# Patient Record
Sex: FEMALE | Race: WHITE | NOT HISPANIC OR LATINO
[De-identification: names, ages, dates, MRNs, and addresses within clinical notes are randomized per-mention and may not be internally consistent; named-entity substitution may affect disease eponyms.]

---

## 2017-06-03 ENCOUNTER — HEALTH MAINTENANCE LETTER (OUTPATIENT)
Age: 37
End: 2017-06-03

## 2017-07-20 ENCOUNTER — TRANSFERRED RECORDS (OUTPATIENT)
Dept: HEALTH INFORMATION MANAGEMENT | Facility: CLINIC | Age: 37
End: 2017-07-20

## 2018-03-02 ENCOUNTER — VIRTUAL VISIT (OUTPATIENT)
Dept: FAMILY MEDICINE | Facility: OTHER | Age: 38
End: 2018-03-02

## 2018-03-04 NOTE — PROGRESS NOTES
"Date:   Clinician: Bette Kendall  Clinician NPI: [id number]  Patient: Una Bhat  Patient : 1980  Patient Address: [address] [place] [address]  Patient Phone: [phone]  Visit Protocol: URI  Patient Summary:  Una is a 37 year old ( : 1980 ) female who initiated a Visit for cold, sinus infection, or influenza. When asked the question \"Please sign me up to receive news, health information and promotions from [employer].\", Una responded \"No\".    Una states her symptoms started gradually 7-9 days ago.   Her symptoms consist of tooth pain, malaise, enlarged lymph nodes, a headache, nasal congestion, a cough, and facial pain or pressure. Una also feels feverish but was unable to measure her temperature.   Symptom details     Nasal secretions: The color of her mucus is blood-tinged and yellow.    Cough: Una coughs a few times an hour and her cough is more bothersome at night. Phlegm comes into her throat when she coughs. She does not believe the phlegm causes the cough. The color of the phlegm is yellow.     Facial pain or pressure: The facial pain or pressure feels worse when bending over or leaning forward.     Headache: She states the headache is moderate (between 4-6 on a 10 point pain scale).     Tooth pain: The tooth pain is not caused by a cavity, recent dental work, or other mouth problems.      Una denies having myalgias, rhinitis, sore throat, chills, dyspnea, ear pain, and wheezing. She also denies double sickening (worsening symptoms after initial improvement), having recent facial or sinus surgery in the past 60 days, and taking antibiotic medication for the symptoms.   She has not recently been exposed to someone with influenza. Una has been in close contact with the following high risk individuals: adults 65 or older.   Una had 1 sinus infection within the past year.   Weight: 255 lbs   " Una does not smoke or use smokeless tobacco.   She denies pregnancy and denies breastfeeding. She is currently menstruating.  MEDICATIONS:  Naproxen (Aleve, Naprosyn), guaifenesin (Robitussin, Mucinex), acetaminophen (Tylenol), phenylephrine (Sudafed PE), guaifenesin + dextromethorphan (Robitussin DM, Mytussin DM, Mucinex DM), and antacid  , ALLERGIES:   penicillin/amoxicillin/augmentin and sulfa (Bactrim/Septra)    Clinician Response:  Dear Una,  Based on the information provided, you have acute bacterial sinusitis, also known as a sinus infection. Sinus infections are caused by bacteria or a virus and symptoms are almost always identical. The difference between the 2 types of infections is timing.  Sinus infections start as viral infections and symptoms improve on their own in about 7 days. If symptoms have not improved after 7 days or have even worsened, a bacterial infection may have developed.  Medication information  I am prescribing:     Moxifloxacin hydrochloride (Avelox) 400 mg oral tablet. Take 1 tablet by mouth 1 time a day for 7 days. There are no refills with this prescription.   This medication is in the fluoroquinolone drug class. Fluoroquinolones are associated with increased risk of tendinitis and tendon rupture in all ages, which can occur during or after treatment. The risk of tendon problems may be increased if you are over 60 years of age, are using steroid medication, have severe kidney disease, or if you have a history of tendon problems. If you experience pain, swelling, inflammation, or rupture of a tendon, stop taking this medication and visit your healthcare provider or an urgent care.  Antibiotics can cause an allergic reaction even if you have taken them without a problem in the past. If you develop a new rash, swelling, or difficulty breathing, stop the medication and be seen in a clinic or urgent care immediately.  A yeast infection is a side effect of taking  antibiotics in some women. Please use [facility] to get treatment if you have symptoms of a yeast infection.  If you become pregnant during this course of treatment, stop taking the medication and contact your primary care provider.  Self care  The following tips will keep you as comfortable as possible while you recover:     Rest    Drink plenty of water and other liquids    Take a hot shower to loosen congestion    Take a spoonful of honey to reduce your cough     When to seek care  Please be seen in a clinic or urgent care if any of the following occur:     Symptoms do not start to improve after 3 days of treatment    New symptoms develop, or symptoms become worse      Diagnosis: Acute bacterial sinusitis  Diagnosis ICD: J01.90  Additional Clinician Notes: Take all of your antibiotics. Get rest anc drink plenty of fluids. Warm steamy showers and humidifiers are helpful. You may continue with the over the counter meds as needed    Prescription: moxifloxacin hydrochloride (Avelox) 400 mg oral tablet 7 tablets, 7 days supply. Take 1 tablet by mouth 1 time a day for 7 days. Refills: 0, Refill as needed: no, Allow substitutions: yes  Pharmacy: [id number] - [phone] - [address] [place] [address]  Addendum created: March 02 07:44:21, 2018 created by: Ney Brewer body: Call in zpak as directed  1 pack  No refills  Thank you

## 2018-05-08 ENCOUNTER — VIRTUAL VISIT (OUTPATIENT)
Dept: FAMILY MEDICINE | Facility: OTHER | Age: 38
End: 2018-05-08

## 2018-05-08 NOTE — PROGRESS NOTES
"Date:   Clinician: Ney Brewer  Clinician NPI: [id number]  Patient: Una Bhat  Patient : 1980  Patient Address: [address] [place] [address]  Patient Phone: [phone]  Visit Protocol: URI  Patient Summary:  Una is a 38 year old ( : 1980 ) female who initiated a Visit for cold, sinus infection, or influenza. When asked the question \"Please sign me up to receive news, health information and promotions from [employer].\", Una responded \"No\".    Una states her symptoms started gradually 3-6 days ago.   Her symptoms consist of malaise, myalgia, facial pain or pressure, a cough, nasal congestion, ear pain, a sore throat, a headache, rhinitis, and tooth pain. Una also feels feverish but was unable to measure her temperature.   Symptom details     Nasal secretions: The color of her mucus is yellow and blood-tinged.    Cough: Una coughs every 5-10 minutes and her cough is more bothersome at night. Phlegm comes into her throat when she coughs. She believes the phlegm causes the cough. The color of the phlegm is yellow.     Sore throat: Una reports having mild throat pain (between 1-3 on a 10 point pain scale), does not have exudate on her tonsils, and can swallow liquids. She is not sure if the lymph nodes in her neck are enlarged. A rash has not appeared on the skin since the sore throat started.     Facial pain or pressure: The facial pain or pressure feels worse when bending over or leaning forward.     Headache: She states the headache is moderate (between 4-6 on a 10 point pain scale).     Tooth pain: The tooth pain is not caused by a cavity, recent dental work, or other mouth problems.      Una denies having chills, wheezing, and dyspnea. She also denies having recent facial or sinus surgery in the past 60 days, taking antibiotic medication for the symptoms, and double sickening (worsening symptoms after " initial improvement).   Una is not sure if she has been exposed to someone with strep throat. She has not recently been exposed to someone with influenza. Una has been in close contact with the following high risk individuals: people with asthma, heart disease or diabetes.   Una had 2 sinus infections within the past year.   Weight: 256 lbs   Una does not smoke or use smokeless tobacco.   She denies pregnancy and denies breastfeeding. She has menstruated in the past month.  MEDICATIONS:      Hydroxyzine pamoate oral    Abilify oral    , ALLERGIES:       Penicillins    Sulfa (Sulfonamide Antibiotics)    vancomycin      Clinician Response:  Dear Una,  Based on the information provided, you have viral sinusitis, also known as a sinus infection. Sinus infections are caused by bacteria or a virus and symptoms are almost always identical. The difference between the 2 types of infections is timing.  Sinus infections start as viral infections and symptoms improve on their own in about 7 days. If symptoms have not improved after 7 days or have even worsened, a bacterial infection may have developed.  Medication information  Because you have a viral infection, antibiotics will not help you get better. Treating a viral infection with antibiotics could actually make you feel worse.  I am prescribing:     Fluticasone 50 mcg/actuation nasal spray. Inhale 1-2 sprays in each nostril 1 time per day. This medication takes several days to start working, so keep taking it even if it doesn't help right away. There are no refills with this prescription.   Self care  The following tips will keep you as comfortable as possible while you recover:     Rest    Drink plenty of water and other liquids    Take a hot shower to loosen congestion    Use throat lozenges    Gargle with warm salt water (1/4 teaspoon of salt per 8 ounce glass of water)    Suck on frozen items such as popsicles or ice cubes     Drink hot tea with lemon and honey    Take a spoonful of honey to reduce your cough     When to seek care  Please be seen in a clinic or urgent care if new symptoms develop, or symptoms become worse.  Call 911 or go to the emergency room if you feel that your throat is closing off, you suddenly develop a rash, you are unable to swallow fluids, you are drooling, or you are having difficulty breathing.   Diagnosis: Viral sinusitis  Diagnosis ICD: J01.90  Prescription: fluticasone 50 mcg/actuation nasal spray,suspension 1 120 spray aerosol with adapter (grams), 30 days supply. Inhale 1-2 sprays in each nostril 1 time per day. Refills: 0, Refill as needed: no, Allow substitutions: yes

## 2019-10-07 ENCOUNTER — PRE VISIT (OUTPATIENT)
Dept: SLEEP MEDICINE | Facility: CLINIC | Age: 39
End: 2019-10-07

## 2019-10-07 NOTE — TELEPHONE ENCOUNTER
"  1.  Reason for the visit:  Consult to discuss idiopathic Hypersomnia  2.  Referring provider and clinic name:  Self  3.  Previous Sleep Doctor or Pulmonlogist (clinic name)?  Previous Study done at [facility]  4.  Records, Procedures, Imaging, and Labs (see below)  BENJAMIN needed        All NOTES from previous office visits that pertain to why they are being seen in the Sleep Center    Previous Sleep Studies, Chest CT, Echos and reports that pertain to why they are seeing Sleep Center    All Sleep records that have been done in the last 2 years that pertain to why they are seeing Sleep Center            Are they being seen for continuation of care for Cpap/Bipap/Avap/Trilogy/Dental Device? None    If yes to above Who and Where was Device issued/currently getting supplies from? na    Are you currently on \"Supplemental Oxygen\" during the day or night?   na                                                                                                                                                      Please remind pt to bring Cpap machine and ask to arrive 15 minutes early to appointment due traffic and congestion                                                 5. Pt Sleep Center Packet received Message left asking pt to arrive 30 minutes early to appointment if no packet received.        Yes: \"please make sure that you bring this to your appointment completed, either the doctor will not see you until this completed or you may be asked to reschedule your appointment.\"     No: mail or email to the pt and explain, \"please make sure that you bring this to your appointment completed, either the doctor will not see you until this completed or you may be asked to reschedule your appointment.\"     ~If pt coming early to fill packet out, ask that they come 30 minutes prior to their appointment~     6. Has the pt's medication list been updated and preferred pharmacy added?     7. Has the allergy list been reviewed?    \"Thank you " "for choosing [facility] and we look forward to seeing you at your upcoming appointment\"     "

## 2019-10-21 ENCOUNTER — OFFICE VISIT (OUTPATIENT)
Dept: SLEEP MEDICINE | Facility: CLINIC | Age: 39
End: 2019-10-21
Payer: COMMERCIAL

## 2019-10-21 VITALS
SYSTOLIC BLOOD PRESSURE: 117 MMHG | RESPIRATION RATE: 16 BRPM | OXYGEN SATURATION: 100 % | BODY MASS INDEX: 52.63 KG/M2 | WEIGHT: 286 LBS | HEART RATE: 83 BPM | DIASTOLIC BLOOD PRESSURE: 76 MMHG | HEIGHT: 62 IN

## 2019-10-21 DIAGNOSIS — G47.8 SLEEP PARALYSIS: Primary | ICD-10-CM

## 2019-10-21 DIAGNOSIS — G25.81 RLS (RESTLESS LEGS SYNDROME): ICD-10-CM

## 2019-10-21 DIAGNOSIS — F51.8 DISRUPTED SLEEP-WAKE CYCLE: ICD-10-CM

## 2019-10-21 DIAGNOSIS — G47.8 UNHEALTHY SLEEP HABIT: ICD-10-CM

## 2019-10-21 DIAGNOSIS — G47.20 DISRUPTED SLEEP-WAKE CYCLE: ICD-10-CM

## 2019-10-21 DIAGNOSIS — G47.21 CIRCADIAN RHYTHM SLEEP DISORDER, DELAYED SLEEP PHASE TYPE: ICD-10-CM

## 2019-10-21 PROCEDURE — 99205 OFFICE O/P NEW HI 60 MIN: CPT | Performed by: NURSE PRACTITIONER

## 2019-10-21 RX ORDER — LURASIDONE HYDROCHLORIDE 120 MG/1
120 TABLET, FILM COATED ORAL DAILY
Refills: 1 | COMMUNITY
Start: 2019-10-10

## 2019-10-21 RX ORDER — SUMATRIPTAN 100 MG/1
100 TABLET, FILM COATED ORAL
COMMUNITY
Start: 2019-08-19

## 2019-10-21 RX ORDER — METHOCARBAMOL 500 MG/1
TABLET, FILM COATED ORAL
Refills: 3 | COMMUNITY
Start: 2019-09-10

## 2019-10-21 RX ORDER — OMEPRAZOLE 40 MG/1
CAPSULE, DELAYED RELEASE ORAL
Refills: 0 | COMMUNITY
Start: 2019-07-23

## 2019-10-21 RX ORDER — HYDROXYCHLOROQUINE SULFATE 200 MG/1
200 TABLET, FILM COATED ORAL
COMMUNITY
Start: 2019-06-20

## 2019-10-21 RX ORDER — HYDROXYZINE HYDROCHLORIDE 50 MG/1
TABLET, FILM COATED ORAL
COMMUNITY

## 2019-10-21 ASSESSMENT — MIFFLIN-ST. JEOR: SCORE: 1930.54

## 2019-10-21 NOTE — PATIENT INSTRUCTIONS
3 wks of sleep diaries  davion to malu re: change in antianxiety med at end of day?    Overnight ox night before return    Sleep on opposite side of bed when in bed not sleeping    No driving if sleepy!!  Pull over and nap is safest    Your BMI is Body mass index is 52.31 kg/m .  Weight management is a personal decision.  If you are interested in exploring weight loss strategies, the following discussion covers the approaches that may be successful. Body mass index (BMI) is one way to tell whether you are at a healthy weight, overweight, or obese. It measures your weight in relation to your height.  A BMI of 18.5 to 24.9 is in the healthy range. A person with a BMI of 25 to 29.9 is considered overweight, and someone with a BMI of 30 or greater is considered obese. More than two-thirds of American adults are considered overweight or obese.  Being overweight or obese increases the risk for further weight gain. Excess weight may lead to heart disease and diabetes.  Creating and following plans for healthy eating and physical activity may help you improve your health.  Weight control is part of healthy lifestyle and includes exercise, emotional health, and healthy eating habits. Careful eating habits lifelong are the mainstay of weight control. Though there are significant health benefits from weight loss, long-term weight loss with diet alone may be very difficult to achieve- studies show long-term success with dietary management in less than 10% of people. Attaining a healthy weight may be especially difficult to achieve in those with severe obesity. In some cases, medications, devices and surgical management might be considered.  What can you do?  If you are overweight or obese and are interested in methods for weight loss, you should discuss this with your provider.     Consider reducing daily calorie intake by 500 calories.     Keep a food journal.     Avoiding skipping meals, consider cutting portions  instead.    Diet combined with exercise helps maintain muscle while optimizing fat loss. Strength training is particularly important for building and maintaining muscle mass. Exercise helps reduce stress, increase energy, and improves fitness. Increasing exercise without diet control, however, may not burn enough calories to loose weight.       Start walking three days a week 10-20 minutes at a time    Work towards walking thirty minutes five days a week     Eventually, increase the speed of your walking for 1-2 minutes at time    In addition, we recommend that you review healthy lifestyles and methods for weight loss available through the National Institutes of Health patient information sites:  http://win.niddk.nih.gov/publications/index.htm    And look into health and wellness programs that may be available through your health insurance provider, employer, local community center, or paz club.    Weight management plan: Patient was referred to their PCP to discuss a diet and exercise plan.
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
## 2019-10-26 PROBLEM — M54.9 POSTOPERATIVE BACK PAIN: Status: ACTIVE | Noted: 2019-10-26

## 2019-10-26 PROBLEM — G89.18 POSTOPERATIVE BACK PAIN: Status: ACTIVE | Noted: 2019-10-26

## 2019-10-26 PROBLEM — Z98.1 S/P LUMBAR SPINAL FUSION: Status: ACTIVE | Noted: 2019-10-26

## 2019-10-26 PROBLEM — J18.9 PNEUMONIA: Status: ACTIVE | Noted: 2018-07-31

## 2019-10-26 PROBLEM — K21.9 GERD (GASTROESOPHAGEAL REFLUX DISEASE): Status: ACTIVE | Noted: 2019-10-26

## 2019-10-26 PROBLEM — M46.46 DISCITIS OF LUMBAR REGION: Status: ACTIVE | Noted: 2019-10-26

## 2019-10-26 PROBLEM — M46.26 ACUTE OSTEOMYELITIS OF LUMBAR SPINE (H): Status: ACTIVE | Noted: 2019-05-09

## 2019-10-26 PROBLEM — M48.062 SPINAL STENOSIS OF LUMBAR REGION WITH NEUROGENIC CLAUDICATION: Status: ACTIVE | Noted: 2019-03-07

## 2019-10-26 PROBLEM — L29.9 ITCHING: Status: ACTIVE | Noted: 2019-02-28

## 2019-10-26 PROBLEM — E66.01 MORBID OBESITY WITH BMI OF 45.0-49.9, ADULT (H): Status: ACTIVE | Noted: 2019-10-26

## 2019-10-26 PROBLEM — Z97.5 IUD CONTRACEPTION: Status: ACTIVE | Noted: 2017-03-23

## 2019-10-27 ENCOUNTER — HEALTH MAINTENANCE LETTER (OUTPATIENT)
Age: 39
End: 2019-10-27

## 2019-12-02 ENCOUNTER — OFFICE VISIT (OUTPATIENT)
Dept: SLEEP MEDICINE | Facility: CLINIC | Age: 39
End: 2019-12-02
Payer: COMMERCIAL

## 2019-12-02 DIAGNOSIS — G47.8 SLEEP PARALYSIS: ICD-10-CM

## 2019-12-02 NOTE — PROGRESS NOTES
Patient presented to clinic for  and demonstration of the KRISS. Patient was set up and instructed use. Patient verbalized understanding and demonstrated set up back to me. Patient will be returning device by 12/3/2019.    Patient was given sleep logs and written instructions for use.

## 2019-12-03 ENCOUNTER — OFFICE VISIT (OUTPATIENT)
Dept: SLEEP MEDICINE | Facility: CLINIC | Age: 39
End: 2019-12-03
Payer: COMMERCIAL

## 2019-12-03 ENCOUNTER — DOCUMENTATION ONLY (OUTPATIENT)
Dept: SLEEP MEDICINE | Facility: CLINIC | Age: 39
End: 2019-12-03
Payer: COMMERCIAL

## 2019-12-03 VITALS
OXYGEN SATURATION: 97 % | HEIGHT: 62 IN | BODY MASS INDEX: 52.44 KG/M2 | RESPIRATION RATE: 16 BRPM | DIASTOLIC BLOOD PRESSURE: 83 MMHG | HEART RATE: 76 BPM | WEIGHT: 285 LBS | SYSTOLIC BLOOD PRESSURE: 130 MMHG

## 2019-12-03 DIAGNOSIS — E66.813 CLASS 3 SEVERE OBESITY WITH BODY MASS INDEX (BMI) OF 50.0 TO 59.9 IN ADULT, UNSPECIFIED OBESITY TYPE, UNSPECIFIED WHETHER SERIOUS COMORBIDITY PRESENT (H): ICD-10-CM

## 2019-12-03 DIAGNOSIS — R06.83 SNORING: Primary | ICD-10-CM

## 2019-12-03 DIAGNOSIS — G47.11 IDIOPATHIC HYPERSOMNIA: ICD-10-CM

## 2019-12-03 DIAGNOSIS — G25.81 RESTLESS LEGS SYNDROME (RLS): ICD-10-CM

## 2019-12-03 DIAGNOSIS — G47.21 CIRCADIAN RHYTHM SLEEP DISORDER, DELAYED SLEEP PHASE TYPE: ICD-10-CM

## 2019-12-03 DIAGNOSIS — E66.01 CLASS 3 SEVERE OBESITY WITH BODY MASS INDEX (BMI) OF 50.0 TO 59.9 IN ADULT, UNSPECIFIED OBESITY TYPE, UNSPECIFIED WHETHER SERIOUS COMORBIDITY PRESENT (H): ICD-10-CM

## 2019-12-03 PROCEDURE — 99214 OFFICE O/P EST MOD 30 MIN: CPT | Performed by: NURSE PRACTITIONER

## 2019-12-03 ASSESSMENT — MIFFLIN-ST. JEOR: SCORE: 1926

## 2019-12-03 NOTE — LETTER
12/3/2019        RE: Gertrude Oneill Indu  [id number]  [facility] [id number]            HPI: Referred by Self    Baseline symptoms: Gertrude has self-referred for feeling fatigue during the day.  She reports that she has had a problem with this since she was a child, was previously diagnosed with idiopathic hypersomnia at [facility] (results pending, BENJAMIN obtained) and she has been on Adderall since that period of time.  Comorbidities likely do play a part with regard to her feelings of fatigue she does have bipolar and when she has periods of hypomania as she did for 2 weeks in October she actually felt quite well however she was tired throughout most of September.  She has had problems with driving sleepy and has had some change in her medication for bipolar with an increase of Latuda she is uncertain how this has impacted her sleepiness.  Prior to being on Vyvanse she was on Adderall.  She also is on hydroxyzine.  With regard to her back pain, she has been able to get off of her tramadol with successful back surgery with her last lumbar spinal fusion done Mary of 2019.    She reports awakening with a headache 3 days a week he had a guard for clenching or grinding but has lost it.    Sleep co-morbities: teeth clenching, does do PT for this, R LS like sounding symptoms of nocturnal itch is currently being treated with hydroxyzine.  It does give her some relief on some nights.  We reviewed her results of ferritin and hemoglobin along with TIBC and she does not appear to be anemic at this time.  I am uncertain if she has ever taken gabapentin 1.5 hours before usual onset of symptoms (this is difficult in someone who has a slightly irregular schedule) to see if itching would subside.  It is my impression that hydroxyzine, if this is restless legs, may further derive the persistence of this syndrome.  She does states that she can take the hydroxyzine and some nights it does not get any better.  I did  get a release of information for Clare Frederick at [facility] for me to forward a copy of this note to her.  Her outside medical records from [facility] did list her as having restless leg syndrome.  I did not review the records for the history of treatment.    Cormorbid conditions bipolar with hypomania (feels less fatigued then) , back pain, cluster B personality disorder    3/3/2015 PSG/with M SLT and prior 2 weeks of actigraphy revealed TST 8.3, shortest time 6.5 hours, longest sleep.  10.5 hours, only 2 nights that were interrupted by movement, napping was noted on 7 days.  3/3/2015 PSG patient slept 9.2 hours with an overall AHI of 0 without hypoxemia and PLM's at 2.3/h and a PLM arousal index of 0.2/h 5 naps were attempted and she fell asleep on all 5 naps without sleep onset marah with a mean sleep latency of 5.2 minutes    Overnight oximetry: 12/3/2019 duration 14 hours 15 minutes percent artifact 4.2% adjusted index 1.2, time less than 88% 0.9.  No suspicion of andres.    Treatment options discussed past treatment for idiopathic hypersomnia included Adderall with significant problems with heart pounding then a brief trial occurred of modafinil.  Currently she is not on any treatment (stimulants) for this.    Barriers to treatment: We talked about lifestyle modifications that can improve excessive sleepiness in patients who have idiopathic hypersomnia.  We talked about dietary improvement, planned short naps, avoiding excessive sedation as one can find with alcohol or stimulants that can affect the timing of sleep onset.  She seemed interested in this information and did ask for the name of the support group that the handout was prepared for.  I will forward this to her through my chart.  With further review of the regularity of her sleep schedule and then talking about the weekend it became apparent that on the weekend she might spend most of the time dozing on and off.  We did talk about the  importance of keeping her circadian rhythm aligned and how this can be beneficial to her general health.  She does state that for a period of time this year she was exercising quite regularly and almost feels like she overdid it.  I did suggest that she may want to consider perhaps PT consult for exercise with the provider who is familiar with the challenges of idiopathic hypersomnia and her other comorbidities.  I will also update Clare Frederick about that suggest can I am uncertain who would have the skill for this type of physical therapy that would be needed.  We also spoke about the probability that her other comorbidities are playing a factor in her complaints of daytime sleepiness.    Preferences she will try to incorporate some of these lifestyle changes regulating her schedule being out of bed more on weekends getting more exercise etc. Clareidalmis Frederick will manage her possible RLS, nighttime onset of itchiness.    Allergies:    Allergies   Allergen Reactions     Eicosapentaenoic Acid (Epa) Anaphylaxis     Levofloxacin Hives     Penicillins Anaphylaxis and Hives     Sertraline Hives     Other reaction(s): Hives       Vancomycin Anaphylaxis     Wasp Venom Protein Anaphylaxis     Chlorhexidine Rash     [place] Itching and Nausea     Patient states allergies to Shatake mushroom.      Nuts Itching     Walnuts and pecans     Cephalexin Hives     Contrast Dye Hives     Pt reports that she has not had problems w/contrast in the past     Hydrocodone-Acetaminophen      Methotrexate Nausea     Morphine Hives     Oxycodone Hives     Shellfish Allergy Other (See Comments)     Anaphylaxis and itching and was hospitalized.     Sodium Metabisulfite      Sulfa Drugs Other (See Comments)     Hives and nausea     Latex Itching, Other (See Comments) and Rash     Mouth swells, rash         Medications:    Current Outpatient Medications   Medication Sig Dispense Refill     adalimumab (HUMIRA *CF*) 40 MG/0.4ML pen kit  "Inject 40 mg Subcutaneous       hydroxychloroquine (PLAQUENIL) 200 MG tablet Take 200 mg by mouth       hydrOXYzine (ATARAX) 50 MG tablet Take 200 mg by mouth daily at bedtime.       LATUDA 120 MG TABS tablet Take 120 mg by mouth daily  1     methocarbamol (ROBAXIN) 500 MG tablet TAKE 1 TABLET BY MOUTH EVERY 6 HOURS IF NEEDED  3     omeprazole (PRILOSEC) 40 MG DR capsule TAKE 1 CAP BY MOUTH 2 TIMES DAILY, 30 MINUTES BEFORE AM AND PM MEAL. NEEDS APPT FOR FURTHER REFILLS  0     SUMAtriptan (IMITREX) 100 MG tablet Take 100 mg by mouth         Problem List:  Patient Active Problem List    Diagnosis Date Noted     S/P lumbar spinal fusion 10/26/2019     Priority: Medium     Morbid obesity with BMI of 45.0-49.9, adult (H) 10/26/2019     Priority: Medium     3/22/17: Intake [facility] [place]:  - referred by: PCP, Clare Frederick MD   - Interested in bariatric/metabolic surgery?: NOT interested in SWL even if does not respond to [facility]  - 36 y/o  - 272.5 lbs, BMI 48.89  - comorbidities: dyslipidemia, NAFLD, GERD, migraines, depression, anxiety and back pain   - intake questionnaire ROS + for: fatigue, insomnia, excessive daytime sleepiness or drowsiness, TMJ, shortness of breath with activity, nausea/vomiting, excessive worry, panic attacks, feeling \"up\" or elated, neck pain, muscle pain, fibromyalgia, joint pain (location hands, hips, knees & back), muscle or joint stiffness, stretch marks, swelling of lower extremities, headaches, migraines and tension headaches  - eating behaviors: night eating, loss of control, skips meals  - labs ordered 3/22/17: lipids, A1C/insulin, D/PTI  - 1/25/17 @PCP: CBC wnl, BMP wnl (glucose 81, Cr 0.74, eGFR >60), AST 12, ALT 21, TSH 0.99 (FT4 7.8)   - 4/11/17 fasting: HgbA1C 4.6%, insulin 12.8, vit D 59.3, PTI 54.1, Ca 9.3, lipids ABnl (Total 162, TG 99, HDL 42, )  - MEDS in program:  - none    History:  - overweight started 9 y/o  - weight gain pattern: " "Gradual over time AND Medication related (reports that topiramate associated w/ 60 lb wt gain (also takes numerous meds that are known to contribute to weight gain: amitriptyline, gabapentin, hydroxyzine, lurasidone, Nexplanon, and, trazodone). Lowest adult weight 125 lbs.  - weight loss attempts:  Exercise (dates: 4775-0319, weight lost: 25 lbs)  Other high protein / low carb diets (dates: 2012, weight lost: 10 lbs)  Own reduced calorie/portions (dates: 6069-8192, weight lost: \"stayed the same\" lbs)  - Past Meds: topiramate [Topamax or Trolandi] (dates: 2010, weight lost: \"gained 60\" lbs)       GERD (gastroesophageal reflux disease) 10/26/2019     Priority: Medium     Takes omeprazole  - 5/28/14 EGD: bx antrum, path \"mild non-specific gastritis, neg for H Pylori\"  ? eospholic esophagitis       Discitis of lumbar region 10/26/2019     Priority: Medium     Postoperative back pain 10/26/2019     Priority: Medium     Acute osteomyelitis of lumbar spine (H) 05/09/2019     Priority: Medium     Spinal stenosis of lumbar region with neurogenic claudication 03/07/2019     Priority: Medium     Itching 02/28/2019     Priority: Medium     atarax at bedtime       Pneumonia 07/31/2018     Priority: Medium     IUD contraception 03/23/2017     Priority: Medium     Zoë inserted 3/23/17 at [facility] (due for removal by 3/2020)       Seronegative rheumatoid arthritis (H) 12/22/2016     Priority: Medium     Suicidal ideation 09/29/2016     Priority: Medium     Cluster B personality disorder (H) 09/29/2016     Priority: Medium     Cluster B personality traits  Cluster B personality traits       Other long term (current) drug therapy 06/09/2016     Priority: Medium     Eosinophilic esophagitis 05/12/2016     Priority: Medium     Pain management contract signed 02/23/2016     Priority: Medium     Inflammatory arthopathy       Tendinitis of finger 11/04/2015     Priority: Medium     Hypersomnia 03/04/2015     Priority: Medium " "    says had sleep study 3/4/15 at [facility] in [place] and negative for NIKITA, but diagnosed with hypersomnia (treated with stimulants, adderall or ritalin)//Kip 3/22/2017       Bipolar II disorder (H) 12/17/2014     Priority: Medium     Takes Latuda (lurasidone)  Sees Dr Dominguez at [facility]       Chronic low back pain 08/26/2014     Priority: Medium     ICD 10  ICD 10       Raynaud disease 06/30/2014     Priority: Medium     Fibromyalgia 06/30/2014     Priority: Medium     NAFLD (nonalcoholic fatty liver disease) 07/20/2012     Priority: Medium     - 7/20/12 abd u/s: \"Coarse echotexture of the liver may be due to fatty infiltration. There is no focal lesion\"  - LFTs elevated:   - 7/18/17 ALT 54, AST 28  - 9/5/17: ALT 22, AST 21 (weight down 25 lbs since 3/2017)  - platelets wnl: 7/18/17 plt 215  No hep A vaccination on record as of 8/2/2017 (has completed Hep B series) - advised Hep A series//[facility]       Vitamin D deficiency 05/09/2012     Priority: Medium     Takes vit D supplements    - 5/9/12: vit D 28.1  - 4/30/14: vit D 31.3  - 6/30/14: vit D 41.4  - 6/17/15: vit D 55.1  - 4/11/17: vit D 59.3, PTI 54.1, Ca 9.3 (advised cont vit D3 5000 Units/day// Kip       Migraines 03/20/2012     Priority: Medium     Patient reports h/o migraines since 19 y/o. Had had normal dilated eye exam. Treated with with rest, imitrex prn. Gets headaches once weekly, lasts 2 hours - 2 days. Had tried topiramate in the past, but \"made me dopey\"//Kip 3/22/2017     - 5/11/12 head MRI: \"Single small focus of increased signal intensity in the right periventricular white matter adjacent to the posterior horn of the right lateral ventricle. This is a single small area of bright signal that is seen on FLAIR images. No enhancement detected. No restricted diffusion. Prime consideration is that of a small area of gliosis from previous injury or small infarct. There are, however, no " "comparison images. Therefore, in a patient of this age, would consider a followup study in 3 to 6 months to ensure that this represents a stable lesion and to exclude other active intracranial Pathology.\"       Insomnia 03/20/2012     Priority: Medium        Past Medical/Surgical History:  No past medical history on file.  No past surgical history on file.        Physical Examination:  Vitals: /83   Pulse 76   Resp 16   Ht 1.575 m (5' 2\")   Wt 129.3 kg (285 lb)   SpO2 97%   BMI 52.13 kg/m     BMI= Body mass index is 52.13 kg/m .         [place] Total Score 12/3/2019   Total score - [place] 13       GENERAL APPEARANCE: healthy, alert and no distress      ASSESSMENT/PLAN:    1. Snoring with negative overnight oximetry.  Weight loss would be first recommendation.    2. Obesity: BMI 52: Likely contributing to his snoring.  At this point in time very low likelihood that there could be obstructive sleep apnea.    3.  RLS:.  May benefit from a switch to timed gabapentin prior to bedtime    4.  Idiopathic hypersomnia with comorbid conditions that do seem to add to complaints of sleepiness. Does not wish to consider stimulants.  Recommend daily exercise, daily planned naps, no driving if sleepy.  I will give her the name of a support group that is for people with narcolepsy but may also except people with idiopathic hypersomnia for support for her to explore lifestyle options that promote alertness and good health.    5. Circadian rhythm: Delayed sleep phase disorder.  She would benefit from cementing a rise time that she could stick with throughout the week and get up and outside and walk her dog.  She needs about 1/2-hour of this light on her daily basis and I believe with her flexible start time at work that this likely could be managed throughout the year as she does not have to be to work particularly early in the morning.    The above note was dictated using voice recognition software. Although reviewed " after completion, some word and grammatical error may remain . Please contact the author for any clarifications.    Time spent with patient is 25 minutes, of which >50% was spent in counseling, education and coordination of care.  .          Sincerely,        GILLIAN Sepulveda CNP

## 2019-12-03 NOTE — PATIENT INSTRUCTIONS
Minimize dif between wakeup times  Reduce naps to <30 mins, but could be frequent  Will nicholas Mckinley re: itch, could it be rls  davion signed  i'll get back to you on suppoirt group    Your BMI is Body mass index is 52.13 kg/m .  Weight management is a personal decision.  If you are interested in exploring weight loss strategies, the following discussion covers the approaches that may be successful. Body mass index (BMI) is one way to tell whether you are at a healthy weight, overweight, or obese. It measures your weight in relation to your height.  A BMI of 18.5 to 24.9 is in the healthy range. A person with a BMI of 25 to 29.9 is considered overweight, and someone with a BMI of 30 or greater is considered obese. More than two-thirds of American adults are considered overweight or obese.  Being overweight or obese increases the risk for further weight gain. Excess weight may lead to heart disease and diabetes.  Creating and following plans for healthy eating and physical activity may help you improve your health.  Weight control is part of healthy lifestyle and includes exercise, emotional health, and healthy eating habits. Careful eating habits lifelong are the mainstay of weight control. Though there are significant health benefits from weight loss, long-term weight loss with diet alone may be very difficult to achieve- studies show long-term success with dietary management in less than 10% of people. Attaining a healthy weight may be especially difficult to achieve in those with severe obesity. In some cases, medications, devices and surgical management might be considered.  What can you do?  If you are overweight or obese and are interested in methods for weight loss, you should discuss this with your provider.     Consider reducing daily calorie intake by 500 calories.     Keep a food journal.     Avoiding skipping meals, consider cutting portions instead.    Diet combined with exercise helps maintain  muscle while optimizing fat loss. Strength training is particularly important for building and maintaining muscle mass. Exercise helps reduce stress, increase energy, and improves fitness. Increasing exercise without diet control, however, may not burn enough calories to loose weight.       Start walking three days a week 10-20 minutes at a time    Work towards walking thirty minutes five days a week     Eventually, increase the speed of your walking for 1-2 minutes at time    In addition, we recommend that you review healthy lifestyles and methods for weight loss available through the National Institutes of Health patient information sites:  http://win.niddk.nih.gov/publications/index.htm    And look into health and wellness programs that may be available through your health insurance provider, employer, local community center, or paz club.    Weight management plan: Patient was referred to their PCP to discuss a diet and exercise plan.

## 2019-12-05 NOTE — PROCEDURES
PHYSICIAN INTERPRETATION   HOME OXIMETRY   Patient: Gertrude Griggs  MRN: [id number]  YOB: 1980  Study Date: 12/3/19   Referring Physician: Radha Ref-Primary, Physician  Ordering Physician: GILLIAN Sepulveda CNP, MD     Conditions:  ,Room air  Quality: artifact 4.2%     Measure [threshold]                 Time less than or equal to SpO2 88% [5 min]:  0.9 min  Duration monitoring [> 2 hours artifact free]:  14:15 hours                    4% O2 desat index [ > 15/ hour]:    1.2 / hour                    Pattern:       normal                                  Assessment: normal study  Normal study      Recommendations:  The following changes in O2/PAP settings were ordered:      Diagnosis Code(s):  Normal study  GILLIAN Sepulveda CNP, December 4, 2019

## 2021-01-14 ENCOUNTER — HEALTH MAINTENANCE LETTER (OUTPATIENT)
Age: 41
End: 2021-01-14

## 2021-05-26 ENCOUNTER — RECORDS - HEALTHEAST (OUTPATIENT)
Dept: ADMINISTRATIVE | Facility: CLINIC | Age: 41
End: 2021-05-26

## 2021-10-23 ENCOUNTER — HEALTH MAINTENANCE LETTER (OUTPATIENT)
Age: 41
End: 2021-10-23

## 2022-02-12 ENCOUNTER — HEALTH MAINTENANCE LETTER (OUTPATIENT)
Age: 42
End: 2022-02-12

## 2022-10-10 ENCOUNTER — HEALTH MAINTENANCE LETTER (OUTPATIENT)
Age: 42
End: 2022-10-10

## 2023-03-25 ENCOUNTER — HEALTH MAINTENANCE LETTER (OUTPATIENT)
Age: 43
End: 2023-03-25

## 2023-03-28 NOTE — PROGRESS NOTES
HPI: Referred by Self    Baseline symptoms: Gertrude has self-referred for feeling fatigue during the day.  She reports that she has had a problem with this since she was a child, was previously diagnosed with idiopathic hypersomnia at [facility] (results pending, BENJAMIN obtained) and she has been on Adderall since that period of time.  Comorbidities likely do play a part with regard to her feelings of fatigue she does have bipolar and when she has periods of hypomania as she did for 2 weeks in October she actually felt quite well however she was tired throughout most of September.  She has had problems with driving sleepy and has had some change in her medication for bipolar with an increase of Latuda she is uncertain how this has impacted her sleepiness.  Prior to being on Vyvanse she was on Adderall.  She also is on hydroxyzine.  With regard to her back pain, she has been able to get off of her tramadol with successful back surgery with her last lumbar spinal fusion done Mary of 2019.    She reports awakening with a headache 3 days a week he had a guard for clenching or grinding but has lost it.    Sleep co-morbities: teeth clenching, does do PT for this, R LS like sounding symptoms of nocturnal itch is currently being treated with hydroxyzine.  It does give her some relief on some nights.  We reviewed her results of ferritin and hemoglobin along with TIBC and she does not appear to be anemic at this time.  I am uncertain if she has ever taken gabapentin 1.5 hours before usual onset of symptoms (this is difficult in someone who has a slightly irregular schedule) to see if itching would subside.  It is my impression that hydroxyzine, if this is restless legs, may further derive the persistence of this syndrome.  She does states that she can take the hydroxyzine and some nights it does not get any better.  I did get a release of information for Clare Frederick at [facility] for me to forward a copy of  this note to her.  Her outside medical records from [facility] did list her as having restless leg syndrome.  I did not review the records for the history of treatment.    Cormorbid conditions bipolar with hypomania (feels less fatigued then) , back pain, cluster B personality disorder    3/3/2015 PSG/with M SLT and prior 2 weeks of actigraphy revealed TST 8.3, shortest time 6.5 hours, longest sleep.  10.5 hours, only 2 nights that were interrupted by movement, napping was noted on 7 days.  3/3/2015 PSG patient slept 9.2 hours with an overall AHI of 0 without hypoxemia and PLM's at 2.3/h and a PLM arousal index of 0.2/h 5 naps were attempted and she fell asleep on all 5 naps without sleep onset marah with a mean sleep latency of 5.2 minutes    Overnight oximetry: 12/3/2019 duration 14 hours 15 minutes percent artifact 4.2% adjusted index 1.2, time less than 88% 0.9.  No suspicion of andres.    Treatment options discussed past treatment for idiopathic hypersomnia included Adderall with significant problems with heart pounding then a brief trial occurred of modafinil.  Currently she is not on any treatment (stimulants) for this.    Barriers to treatment: We talked about lifestyle modifications that can improve excessive sleepiness in patients who have idiopathic hypersomnia.  We talked about dietary improvement, planned short naps, avoiding excessive sedation as one can find with alcohol or stimulants that can affect the timing of sleep onset.  She seemed interested in this information and did ask for the name of the support group that the handout was prepared for.  I will forward this to her through my chart.  With further review of the regularity of her sleep schedule and then talking about the weekend it became apparent that on the weekend she might spend most of the time dozing on and off.  We did talk about the importance of keeping her circadian rhythm aligned and how this can be beneficial to her general health.   She does state that for a period of time this year she was exercising quite regularly and almost feels like she overdid it.  I did suggest that she may want to consider perhaps PT consult for exercise with the provider who is familiar with the challenges of idiopathic hypersomnia and her other comorbidities.  I will also update Clare Frederick about that suggest can I am uncertain who would have the skill for this type of physical therapy that would be needed.  We also spoke about the probability that her other comorbidities are playing a factor in her complaints of daytime sleepiness.    Preferences she will try to incorporate some of these lifestyle changes regulating her schedule being out of bed more on weekends getting more exercise etc. Clare Frederick will manage her possible RLS, nighttime onset of itchiness.    Allergies:    Allergies   Allergen Reactions     Eicosapentaenoic Acid (Epa) Anaphylaxis     Levofloxacin Hives     Penicillins Anaphylaxis and Hives     Sertraline Hives     Other reaction(s): Hives       Vancomycin Anaphylaxis     Wasp Venom Protein Anaphylaxis     Chlorhexidine Rash     [place] Itching and Nausea     Patient states allergies to S[place]ake mushroom.      Nuts Itching     Walnuts and pecans     Cephalexin Hives     Contrast Dye Hives     Pt reports that she has not had problems w/contrast in the past     Hydrocodone-Acetaminophen      Methotrexate Nausea     Morphine Hives     Oxycodone Hives     Shellfish Allergy Other (See Comments)     Anaphylaxis and itching and was hospitalized.     Sodium Metabisulfite      Sulfa Drugs Other (See Comments)     Hives and nausea     Latex Itching, Other (See Comments) and Rash     Mouth swells, rash         Medications:    Current Outpatient Medications   Medication Sig Dispense Refill     adalimumab (HUMIRA *CF*) 40 MG/0.4ML pen kit Inject 40 mg Subcutaneous       hydroxychloroquine (PLAQUENIL) 200 MG tablet Take 200 mg by mouth        "hydrOXYzine (ATARAX) 50 MG tablet Take 200 mg by mouth daily at bedtime.       LATUDA 120 MG TABS tablet Take 120 mg by mouth daily  1     methocarbamol (ROBAXIN) 500 MG tablet TAKE 1 TABLET BY MOUTH EVERY 6 HOURS IF NEEDED  3     omeprazole (PRILOSEC) 40 MG DR capsule TAKE 1 CAP BY MOUTH 2 TIMES DAILY, 30 MINUTES BEFORE AM AND PM MEAL. NEEDS APPT FOR FURTHER REFILLS  0     SUMAtriptan (IMITREX) 100 MG tablet Take 100 mg by mouth         Problem List:  Patient Active Problem List    Diagnosis Date Noted     S/P lumbar spinal fusion 10/26/2019     Priority: Medium     Morbid obesity with BMI of 45.0-49.9, adult (H) 10/26/2019     Priority: Medium     3/22/17: Intake [facility] [place]:  - referred by: PCP, Clare Frederick MD   - Interested in bariatric/metabolic surgery?: NOT interested in SWL even if does not respond to MWL  - 38 y/o  - 272.5 lbs, BMI 48.89  - comorbidities: dyslipidemia, NAFLD, GERD, migraines, depression, anxiety and back pain   - intake questionnaire ROS + for: fatigue, insomnia, excessive daytime sleepiness or drowsiness, TMJ, shortness of breath with activity, nausea/vomiting, excessive worry, panic attacks, feeling \"up\" or elated, neck pain, muscle pain, fibromyalgia, joint pain (location hands, hips, knees & back), muscle or joint stiffness, stretch marks, swelling of lower extremities, headaches, migraines and tension headaches  - eating behaviors: night eating, loss of control, skips meals  - labs ordered 3/22/17: lipids, A1C/insulin, D/PTI  - 1/25/17 @PCP: CBC wnl, BMP wnl (glucose 81, Cr 0.74, eGFR >60), AST 12, ALT 21, TSH 0.99 (FT4 7.8)   - 4/11/17 fasting: HgbA1C 4.6%, insulin 12.8, vit D 59.3, PTI 54.1, Ca 9.3, lipids ABnl (Total 162, TG 99, HDL 42, )  - MEDS in program:  - none    History:  - overweight started 9 y/o  - weight gain pattern: Gradual over time AND Medication related (reports that topiramate associated w/ 60 lb wt gain (also takes " "numerous meds that are known to contribute to weight gain: amitriptyline, gabapentin, hydroxyzine, lurasidone, Nexplanon, and, trazodone). Lowest adult weight 125 lbs.  - weight loss attempts:  Exercise (dates: 8464-5496, weight lost: 25 lbs)  Other high protein / low carb diets (dates: 2012, weight lost: 10 lbs)  Own reduced calorie/portions (dates: 1975-7930, weight lost: \"stayed the same\" lbs)  - Past Meds: topiramate [Topamax or Trolandi] (dates: 2010, weight lost: \"gained 60\" lbs)       GERD (gastroesophageal reflux disease) 10/26/2019     Priority: Medium     Takes omeprazole  - 5/28/14 EGD: bx antrum, path \"mild non-specific gastritis, neg for H Pylori\"  ? eospholic esophagitis       Discitis of lumbar region 10/26/2019     Priority: Medium     Postoperative back pain 10/26/2019     Priority: Medium     Acute osteomyelitis of lumbar spine (H) 05/09/2019     Priority: Medium     Spinal stenosis of lumbar region with neurogenic claudication 03/07/2019     Priority: Medium     Itching 02/28/2019     Priority: Medium     atarax at bedtime       Pneumonia 07/31/2018     Priority: Medium     IUD contraception 03/23/2017     Priority: Medium     Zoë inserted 3/23/17 at [facility] (due for removal by 3/2020)       Seronegative rheumatoid arthritis (H) 12/22/2016     Priority: Medium     Suicidal ideation 09/29/2016     Priority: Medium     Cluster B personality disorder (H) 09/29/2016     Priority: Medium     Cluster B personality traits  Cluster B personality traits       Other long term (current) drug therapy 06/09/2016     Priority: Medium     Eosinophilic esophagitis 05/12/2016     Priority: Medium     Pain management contract signed 02/23/2016     Priority: Medium     Inflammatory arthopathy       Tendinitis of finger 11/04/2015     Priority: Medium     Hypersomnia 03/04/2015     Priority: Medium     says had sleep study 3/4/15 at [facility] in [place] and negative for NIKITA, but diagnosed with " "hypersomnia (treated with stimulants, adderall or ritalin)//Kip 3/22/2017       Bipolar II disorder (H) 12/17/2014     Priority: Medium     Takes Latuda (lurasidone)  Sees Dr Dominguez at [facility]       Chronic low back pain 08/26/2014     Priority: Medium     ICD 10  ICD 10       Raynaud disease 06/30/2014     Priority: Medium     Fibromyalgia 06/30/2014     Priority: Medium     NAFLD (nonalcoholic fatty liver disease) 07/20/2012     Priority: Medium     - 7/20/12 abd u/s: \"Coarse echotexture of the liver may be due to fatty infiltration. There is no focal lesion\"  - LFTs elevated:   - 7/18/17 ALT 54, AST 28  - 9/5/17: ALT 22, AST 21 (weight down 25 lbs since 3/2017)  - platelets wnl: 7/18/17 plt 215  No hep A vaccination on record as of 8/2/2017 (has completed Hep B series) - advised Hep A series//[facility]       Vitamin D deficiency 05/09/2012     Priority: Medium     Takes vit D supplements    - 5/9/12: vit D 28.1  - 4/30/14: vit D 31.3  - 6/30/14: vit D 41.4  - 6/17/15: vit D 55.1  - 4/11/17: vit D 59.3, PTI 54.1, Ca 9.3 (advised cont vit D3 5000 Units/day// Kip       Migraines 03/20/2012     Priority: Medium     Patient reports h/o migraines since 19 y/o. Had had normal dilated eye exam. Treated with with rest, imitrex prn. Gets headaches once weekly, lasts 2 hours - 2 days. Had tried topiramate in the past, but \"made me dopey\"//Kip 3/22/2017     - 5/11/12 head MRI: \"Single small focus of increased signal intensity in the right periventricular white matter adjacent to the posterior horn of the right lateral ventricle. This is a single small area of bright signal that is seen on FLAIR images. No enhancement detected. No restricted diffusion. Prime consideration is that of a small area of gliosis from previous injury or small infarct. There are, however, no comparison images. Therefore, in a patient of this age, would consider a followup study in 3 to 6 months " "to ensure that this represents a stable lesion and to exclude other active intracranial Pathology.\"       Insomnia 03/20/2012     Priority: Medium        Past Medical/Surgical History:  No past medical history on file.  No past surgical history on file.        Physical Examination:  Vitals: /83   Pulse 76   Resp 16   Ht 1.575 m (5' 2\")   Wt 129.3 kg (285 lb)   SpO2 97%   BMI 52.13 kg/m    BMI= Body mass index is 52.13 kg/m .         Paauilo Total Score 12/3/2019   Total score - Paauilo 13       GENERAL APPEARANCE: healthy, alert and no distress      ASSESSMENT/PLAN:    1. Snoring with negative overnight oximetry.  Weight loss would be first recommendation.    2. Obesity: BMI 52: Likely contributing to his snoring.  At this point in time very low likelihood that there could be obstructive sleep apnea.    3.  RLS:.  May benefit from a switch to timed gabapentin prior to bedtime    4.  Idiopathic hypersomnia with comorbid conditions that do seem to add to complaints of sleepiness. Does not wish to consider stimulants.  Recommend daily exercise, daily planned naps, no driving if sleepy.  I will give her the name of a support group that is for people with narcolepsy but may also except people with idiopathic hypersomnia for support for her to explore lifestyle options that promote alertness and good health.    5. Circadian rhythm: Delayed sleep phase disorder.  She would benefit from cementing a rise time that she could stick with throughout the week and get up and outside and walk her dog.  She needs about 1/2-hour of this light on her daily basis and I believe with her flexible start time at work that this likely could be managed throughout the year as she does not have to be to work particularly early in the morning.    The above note was dictated using voice recognition software. Although reviewed after completion, some word and grammatical error may remain . Please contact the author for any " Alert-The patient is alert, awake and responds to voice. The patient is oriented to time, place, and person. The triage nurse is able to obtain subjective information. clarifications.    Time spent with patient is 25 minutes, of which >50% was spent in counseling, education and coordination of care.  .

## 2023-12-07 ENCOUNTER — HOSPITAL ENCOUNTER (EMERGENCY)
Facility: CLINIC | Age: 43
Discharge: HOME | End: 2023-12-08
Attending: EMERGENCY MEDICINE | Admitting: EMERGENCY MEDICINE
Payer: COMMERCIAL

## 2023-12-07 VITALS
RESPIRATION RATE: 16 BRPM | OXYGEN SATURATION: 98 % | HEART RATE: 84 BPM | WEIGHT: 293 LBS | TEMPERATURE: 98.2 F | SYSTOLIC BLOOD PRESSURE: 149 MMHG | DIASTOLIC BLOOD PRESSURE: 93 MMHG | BODY MASS INDEX: 59.44 KG/M2

## 2023-12-07 DIAGNOSIS — R42 VERTIGO: ICD-10-CM

## 2023-12-07 LAB
ANION GAP SERPL CALCULATED.3IONS-SCNC: 11 MMOL/L (ref 7–15)
ATRIAL RATE - MUSE: 80 BPM
BASOPHILS # BLD AUTO: 0 10E3/UL (ref 0–0.2)
BASOPHILS NFR BLD AUTO: 0 %
BUN SERPL-MCNC: 14.6 MG/DL (ref 6–20)
CALCIUM SERPL-MCNC: 8.9 MG/DL (ref 8.6–10)
CHLORIDE SERPL-SCNC: 106 MMOL/L (ref 98–107)
CREAT SERPL-MCNC: 0.77 MG/DL (ref 0.51–0.95)
DEPRECATED HCO3 PLAS-SCNC: 25 MMOL/L (ref 22–29)
DIASTOLIC BLOOD PRESSURE - MUSE: NORMAL MMHG
EGFRCR SERPLBLD CKD-EPI 2021: >90 ML/MIN/1.73M2
EOSINOPHIL # BLD AUTO: 0.2 10E3/UL (ref 0–0.7)
EOSINOPHIL NFR BLD AUTO: 1 %
ERYTHROCYTE [DISTWIDTH] IN BLOOD BY AUTOMATED COUNT: 12.5 % (ref 10–15)
FLUAV RNA SPEC QL NAA+PROBE: NEGATIVE
FLUBV RNA RESP QL NAA+PROBE: NEGATIVE
GLUCOSE SERPL-MCNC: 118 MG/DL (ref 70–99)
HCG SERPL QL: NEGATIVE
HCT VFR BLD AUTO: 42.6 % (ref 35–47)
HGB BLD-MCNC: 14.4 G/DL (ref 11.7–15.7)
IMM GRANULOCYTES # BLD: 0 10E3/UL
IMM GRANULOCYTES NFR BLD: 0 %
INTERPRETATION ECG - MUSE: NORMAL
LYMPHOCYTES # BLD AUTO: 2.4 10E3/UL (ref 0.8–5.3)
LYMPHOCYTES NFR BLD AUTO: 20 %
MCH RBC QN AUTO: 29.4 PG (ref 26.5–33)
MCHC RBC AUTO-ENTMCNC: 33.8 G/DL (ref 31.5–36.5)
MCV RBC AUTO: 87 FL (ref 78–100)
MONOCYTES # BLD AUTO: 0.9 10E3/UL (ref 0–1.3)
MONOCYTES NFR BLD AUTO: 7 %
NEUTROPHILS # BLD AUTO: 8.7 10E3/UL (ref 1.6–8.3)
NEUTROPHILS NFR BLD AUTO: 72 %
NRBC # BLD AUTO: 0 10E3/UL
NRBC BLD AUTO-RTO: 0 /100
P AXIS - MUSE: 34 DEGREES
PLATELET # BLD AUTO: 198 10E3/UL (ref 150–450)
POTASSIUM SERPL-SCNC: 4.3 MMOL/L (ref 3.4–5.3)
PR INTERVAL - MUSE: 152 MS
QRS DURATION - MUSE: 94 MS
QT - MUSE: 398 MS
QTC - MUSE: 459 MS
R AXIS - MUSE: 15 DEGREES
RBC # BLD AUTO: 4.9 10E6/UL (ref 3.8–5.2)
RSV RNA SPEC NAA+PROBE: NEGATIVE
SARS-COV-2 RNA RESP QL NAA+PROBE: NEGATIVE
SODIUM SERPL-SCNC: 142 MMOL/L (ref 135–145)
SYSTOLIC BLOOD PRESSURE - MUSE: NORMAL MMHG
T AXIS - MUSE: 38 DEGREES
VENTRICULAR RATE- MUSE: 80 BPM
WBC # BLD AUTO: 12.3 10E3/UL (ref 4–11)

## 2023-12-07 PROCEDURE — 87637 SARSCOV2&INF A&B&RSV AMP PRB: CPT | Performed by: STUDENT IN AN ORGANIZED HEALTH CARE EDUCATION/TRAINING PROGRAM

## 2023-12-07 PROCEDURE — 96361 HYDRATE IV INFUSION ADD-ON: CPT

## 2023-12-07 PROCEDURE — 250N000011 HC RX IP 250 OP 636: Mod: JZ | Performed by: STUDENT IN AN ORGANIZED HEALTH CARE EDUCATION/TRAINING PROGRAM

## 2023-12-07 PROCEDURE — 80048 BASIC METABOLIC PNL TOTAL CA: CPT | Performed by: EMERGENCY MEDICINE

## 2023-12-07 PROCEDURE — 36415 COLL VENOUS BLD VENIPUNCTURE: CPT | Performed by: STUDENT IN AN ORGANIZED HEALTH CARE EDUCATION/TRAINING PROGRAM

## 2023-12-07 PROCEDURE — 80048 BASIC METABOLIC PNL TOTAL CA: CPT | Performed by: STUDENT IN AN ORGANIZED HEALTH CARE EDUCATION/TRAINING PROGRAM

## 2023-12-07 PROCEDURE — 87637 SARSCOV2&INF A&B&RSV AMP PRB: CPT | Performed by: EMERGENCY MEDICINE

## 2023-12-07 PROCEDURE — 250N000011 HC RX IP 250 OP 636: Performed by: EMERGENCY MEDICINE

## 2023-12-07 PROCEDURE — 258N000003 HC RX IP 258 OP 636: Performed by: STUDENT IN AN ORGANIZED HEALTH CARE EDUCATION/TRAINING PROGRAM

## 2023-12-07 PROCEDURE — 99284 EMERGENCY DEPT VISIT MOD MDM: CPT | Mod: 25

## 2023-12-07 PROCEDURE — 84703 CHORIONIC GONADOTROPIN ASSAY: CPT | Performed by: EMERGENCY MEDICINE

## 2023-12-07 PROCEDURE — 93005 ELECTROCARDIOGRAM TRACING: CPT

## 2023-12-07 PROCEDURE — 96374 THER/PROPH/DIAG INJ IV PUSH: CPT

## 2023-12-07 PROCEDURE — 85025 COMPLETE CBC W/AUTO DIFF WBC: CPT | Performed by: EMERGENCY MEDICINE

## 2023-12-07 PROCEDURE — 96375 TX/PRO/DX INJ NEW DRUG ADDON: CPT

## 2023-12-07 PROCEDURE — 85025 COMPLETE CBC W/AUTO DIFF WBC: CPT | Performed by: STUDENT IN AN ORGANIZED HEALTH CARE EDUCATION/TRAINING PROGRAM

## 2023-12-07 PROCEDURE — 84703 CHORIONIC GONADOTROPIN ASSAY: CPT | Performed by: STUDENT IN AN ORGANIZED HEALTH CARE EDUCATION/TRAINING PROGRAM

## 2023-12-07 RX ORDER — DIAZEPAM 10 MG/2ML
2.5 INJECTION, SOLUTION INTRAMUSCULAR; INTRAVENOUS ONCE
Status: COMPLETED | OUTPATIENT
Start: 2023-12-07 | End: 2023-12-07

## 2023-12-07 RX ORDER — ONDANSETRON 2 MG/ML
4 INJECTION INTRAMUSCULAR; INTRAVENOUS ONCE
Status: COMPLETED | OUTPATIENT
Start: 2023-12-07 | End: 2023-12-07

## 2023-12-07 RX ADMIN — DIAZEPAM 2.5 MG: 10 INJECTION, SOLUTION INTRAMUSCULAR; INTRAVENOUS at 23:50

## 2023-12-07 RX ADMIN — SODIUM CHLORIDE 1000 ML: 9 INJECTION, SOLUTION INTRAVENOUS at 20:09

## 2023-12-07 RX ADMIN — ONDANSETRON 4 MG: 2 INJECTION INTRAMUSCULAR; INTRAVENOUS at 20:09

## 2023-12-07 ASSESSMENT — ACTIVITIES OF DAILY LIVING (ADL): ADLS_ACUITY_SCORE: 33

## 2023-12-08 PROCEDURE — 250N000011 HC RX IP 250 OP 636: Performed by: EMERGENCY MEDICINE

## 2023-12-08 PROCEDURE — 96376 TX/PRO/DX INJ SAME DRUG ADON: CPT

## 2023-12-08 RX ORDER — MECLIZINE HYDROCHLORIDE 25 MG/1
25 TABLET ORAL 3 TIMES DAILY PRN
Qty: 15 TABLET | Refills: 0 | Status: SHIPPED | OUTPATIENT
Start: 2023-12-08

## 2023-12-08 RX ORDER — DIAZEPAM 10 MG/2ML
2.5 INJECTION, SOLUTION INTRAMUSCULAR; INTRAVENOUS ONCE
Status: COMPLETED | OUTPATIENT
Start: 2023-12-08 | End: 2023-12-08

## 2023-12-08 RX ORDER — DIAZEPAM 5 MG
5 TABLET ORAL EVERY 6 HOURS PRN
Qty: 10 TABLET | Refills: 0 | Status: SHIPPED | OUTPATIENT
Start: 2023-12-08

## 2023-12-08 RX ADMIN — DIAZEPAM 2.5 MG: 10 INJECTION, SOLUTION INTRAMUSCULAR; INTRAVENOUS at 00:36

## 2023-12-08 NOTE — ED TRIAGE NOTES
Pt c/o of dizziness and vomiting since yesterday. Pt has hx of vertigo.  at bedside.      Triage Assessment (Adult)       Row Name 12/07/23 1957          Triage Assessment    Airway WDL WDL        Respiratory WDL    Respiratory WDL WDL        Skin Circulation/Temperature WDL    Skin Circulation/Temperature WDL WDL        Cardiac WDL    Cardiac WDL WDL        Peripheral/Neurovascular WDL    Peripheral Neurovascular WDL WDL        Cognitive/Neuro/Behavioral WDL    Cognitive/Neuro/Behavioral WDL WDL

## 2023-12-08 NOTE — DISCHARGE INSTRUCTIONS
Discharge Instructions  Vertigo  You have been diagnosed with vertigo.  This is a dizzy feeling often described as spinning or that the room is moving around you. You will often have nausea (sick to your stomach), vomiting (throwing up), and balance problems with it.  Vertigo is usually caused by a problem in the inner ear which helps control your balance.  Many things can cause vertigo, including calcium collections in the inner ear, a virus infection of the inner ear, concussion, migraine, and some medicines.  Luckily, these causes are not life threatening and will eventually go away.  However, sometimes there is a serious problem that does not show up right away.  Generally, every Emergency Department visit should have a follow-up clinic visit with either a primary or a specialty clinic/provider. Please follow-up as instructed by your emergency provider today.  Return to the Emergency Department if you have:  New or severe headache.  Double vision (seeing two of things).  Trouble speaking or hearing.  Weakness or trouble moving/using one side of your body.  Passing out.  Numbness or tingling.  Chest pain.  Vomiting that will not stop.    Treatment:  There are several commonly prescribed medications:  Antihistamines such as meclizine (Antivert ), dimenhydrinate (Dramamine ), or diphenhydramine (Benadryl ).  Prescription anti-nausea medicines, such as promethazine (Phenergan ), metoclopramide (Reglan ), or ondansetron (Zofran ).  Prescription sedative medicines, such as diazepam (Valium ), lorazepam (Ativan ), or clonazepam (Klonopin ).  Most of these medicines make you sleepy, and you should not take them before you work or drive. You should only take prescription medicines to treat severe vertigo symptoms, and you should stop the medicine when your symptoms improve.    Follow Up:  If you have vertigo longer than three days, it is important that you follow up either with your primary provider or an Ear, Nose, and  Throat (ENT) specialist.  You may need further testing to evaluate your vertigo and you may also need  vestibular  therapy which is a special form of physical therapy to make the vertigo go away.    If you were given a prescription for medicine here today, be sure to read all of the information (including the package insert) that comes with your prescription.  This will include important information about the medicine, its side effects, and any warnings that you need to know about.  The pharmacist who fills the prescription can provide more information and answer questions you may have about the medicine.  If you have questions or concerns that the pharmacist cannot address, please call or return to the Emergency Department.     Remember that you can always come back to the Emergency Department if you are not able to see your regular provider in the amount of time listed above, if you get any new symptoms, or if there is anything that worries you.

## 2023-12-08 NOTE — ED PROVIDER NOTES
"    History     Chief Complaint:  Dizziness and Vomiting       HPI   Gertrude Griggs is a 43 year old female with history of vertigo who presents to the ED for evaluation of dizziness and vomiting. She reports feeling lightheaded 2 days ago at work which led her to leave early. She reports waking up yesterday morning with the room spinning and dry heaving. She states going to work yesterday, bent down to pick something up and noticed the room was spinning \"very much\". She went to Urgent Care and given meclizine, which did dull the symptoms but did not take them away. She states feeling a bit better but continued to vomit everything she ate or drank. If she is laying down the room spins. Last time she had vertigo, she also had an ear infection.  She reports that her vertigo lasted for 1 week last time she had it.  Denies cold, cough or congestion.     Independent Historian:     provides supplemental history as noted above.     Review of External Notes:        Medications:    hydroxychloroquine (PLAQUENIL) 200 MG tablet  hydrOXYzine (ATARAX) 50 MG tablet  LATUDA 120 MG TABS tablet  methocarbamol (ROBAXIN) 500 MG tablet  omeprazole (PRILOSEC) 40 MG DR capsule  SUMAtriptan (IMITREX) 100 MG tablet    Past Medical History:    Vitamin D deficiency  Suicidal ideation  Raynaud disease  Pneumonia  NAFLD  Migraines  IUD  Insomnia  Hypersomnia  GERD   Fibromyalgia  Bipolar 2 disorder  Cluster B personality disorder  Vertigo    Past Surgical History:    Laminec/Facetect/Foramin Lumbar  Laparoscopic cholecystectomy  EGD   Endoscopy   Colonoscopy  [place] teeth extraction  Tonsillectomy  Back surgery  Foot surgery  Right knee arthroscopy      Physical Exam   Patient Vitals for the past 24 hrs:   BP Temp Temp src Pulse Resp SpO2 Weight   12/07/23 1956 (!) 149/93 98.2  F (36.8  C) Oral 84 16 98 % 147.4 kg (325 lb)      Physical Exam  Gen: No distress.  Nontoxic.  Head: Atraumatic.  No hematomas, lesions, abrasions  Eyes: " Beating fatigable nystagmus when the patient looks towards the left.  Ears: Tympanic membranes intact.  No redness or bulging.  Ear canals are patent.  No tenderness to palpation of the mastoids.  Neck: No midline tenderness of the spine.  Mouth: No oral lesions, or abrasions.  Mucous membranes are moist.  Resp: Equal breath sounds, normal respiratory effort  Cardio: Regular rate and rhythm, no murmurs  Abdomen: Soft, nontender, nondistended  MSK; no extremity swelling, bruising, or abrasions.  Neuro: Cranial nerves II through XII intact.  5 out of 5 muscle strength in the upper and lower extremities.  Sensation intact in the upper and lower extremities.  Finger-to-nose negative.  Heel-to-shin negative.  No truncal ataxia.  Psych: Appropriate affect.      Emergency Department Course   ECG  ECG results from 12/07/23   EKG 12 lead     Value    Systolic Blood Pressure     Diastolic Blood Pressure     Ventricular Rate 80    Atrial Rate 80    [place] Interval 152    QRS Duration 94        QTc 459    P Axis 34    R AXIS 15    T Axis 38    Interpretation ECG      Sinus rhythm  Normal ECG  No previous ECGs available         Laboratory:  Labs Ordered and Resulted from Time of ED Arrival to Time of ED Departure   BASIC METABOLIC PANEL - Abnormal       Result Value    Sodium 142      Potassium 4.3      Chloride 106      Carbon Dioxide (CO2) 25      Anion Gap 11      Urea Nitrogen 14.6      Creatinine 0.77      GFR Estimate >90      Calcium 8.9      Glucose 118 (*)    CBC WITH PLATELETS AND DIFFERENTIAL - Abnormal    WBC Count 12.3 (*)     RBC Count 4.90      Hemoglobin 14.4      Hematocrit 42.6      MCV 87      MCH 29.4      MCHC 33.8      RDW 12.5      Platelet Count 198      % Neutrophils 72      % Lymphocytes 20      % Monocytes 7      % Eosinophils 1      % Basophils 0      % Immature Granulocytes 0      NRBCs per 100 WBC 0      Absolute Neutrophils 8.7 (*)     Absolute Lymphocytes 2.4      Absolute Monocytes 0.9       Absolute Eosinophils 0.2      Absolute Basophils 0.0      Absolute Immature Granulocytes 0.0      Absolute NRBCs 0.0     INFLUENZA A/B, RSV, & SARS-COV2 PCR - Normal    Influenza A PCR Negative      Influenza B PCR Negative      RSV PCR Negative      SARS CoV2 PCR Negative     HCG QUALITATIVE PREGNANCY - Normal    hCG Serum Qualitative Negative        Procedures   None    Emergency Department Course & Assessments:       Interventions:  Medications   diazepam (VALIUM) injection 2.5 mg (has no administration in time range)   ondansetron (ZOFRAN) injection 4 mg (4 mg Intravenous $Given 23)   sodium chloride 0.9% BOLUS 1,000 mL (0 mLs Intravenous Stopped 23)   diazepam (VALIUM) injection 2.5 mg (2.5 mg Intravenous $Given 23)      Independent Interpretation (X-rays, CTs, rhythm strip):  None    Assessments/Consultations/Discussion of Management or Tests:   ED Course as of 23 0020   u Dec 07, 2023   2300 I obtained history and examined the patient as noted above.    2307 I prepared the patient to be discharged home.    Fri Dec 08, 2023   0012 I rechecked the patient and explained findings.      Social Determinants of Health affecting care:  None     Disposition:  The patient was discharged to home.     Impression & Plan      Medical Decision Makin-year-old female with a history of vertigo presents emergency department with a complaint of dizziness.  Patient reports that she bent down to pick something up and when she stood up the room started spinning suddenly.  She had nausea and vomiting.  She did try meclizine which did not help.  On neuroexam patient does have nystagmus that is beating and fatigable towards the left.  No other focal deficits. I have low suspicion for posterior infarct. No signs of ear infection.  No hearing loss.  No vision changes.  No problems with gait.  She is given Valium with significant improvement of her symptoms.  She does still report if she turns  her head towards the right the spinning will continue.  She is given another small dose of Valium.  She states that she is ready to go home.  She states that she can manage this at home with most of her symptoms resolved.  I did talk with the patient about PT evaluation for vertigo.  I will place a referral.  I will prescribe her some Valium for home as well as some meclizine.  Patient is discharged home.    Diagnosis:    ICD-10-CM    1. Vertigo  R42 Physical Therapy Referral           Discharge Medications:  New Prescriptions    DIAZEPAM (VALIUM) 5 MG TABLET    Take 1 tablet (5 mg) by mouth every 6 hours as needed for anxiety or sleep    MECLIZINE (ANTIVERT) 25 MG TABLET    Take 1 tablet (25 mg) by mouth 3 times daily as needed for dizziness      Scribe Disclosure:  I, Sary Crook, am serving as a scribe at 11:35 PM on 12/7/2023 to document services personally performed by Clare Cha MD based on my observations and the provider's statements to me.  12/7/2023   Clare Cha MD Richardson, Elizabeth, MD  12/08/23 0522

## 2024-03-17 ENCOUNTER — HEALTH MAINTENANCE LETTER (OUTPATIENT)
Age: 44
End: 2024-03-17

## 2024-05-26 ENCOUNTER — HEALTH MAINTENANCE LETTER (OUTPATIENT)
Age: 44
End: 2024-05-26

## 2025-06-14 ENCOUNTER — HEALTH MAINTENANCE LETTER (OUTPATIENT)
Age: 45
End: 2025-06-14